# Patient Record
Sex: MALE | Race: WHITE | Employment: FULL TIME | URBAN - METROPOLITAN AREA
[De-identification: names, ages, dates, MRNs, and addresses within clinical notes are randomized per-mention and may not be internally consistent; named-entity substitution may affect disease eponyms.]

---

## 2021-06-08 ENCOUNTER — APPOINTMENT (OUTPATIENT)
Dept: GENERAL RADIOLOGY | Age: 26
End: 2021-06-08
Attending: EMERGENCY MEDICINE
Payer: COMMERCIAL

## 2021-06-08 ENCOUNTER — HOSPITAL ENCOUNTER (EMERGENCY)
Age: 26
Discharge: HOME | End: 2021-06-08
Attending: EMERGENCY MEDICINE
Payer: COMMERCIAL

## 2021-06-08 VITALS
BODY MASS INDEX: 24.39 KG/M2 | WEIGHT: 184 LBS | HEART RATE: 66 BPM | HEIGHT: 72.84 IN | SYSTOLIC BLOOD PRESSURE: 119 MMHG | TEMPERATURE: 97 F | OXYGEN SATURATION: 100 % | RESPIRATION RATE: 16 BRPM | DIASTOLIC BLOOD PRESSURE: 88 MMHG

## 2021-06-08 DIAGNOSIS — R07.9 CHEST PAIN OF UNCERTAIN ETIOLOGY: Primary | ICD-10-CM

## 2021-06-08 PROCEDURE — 80053 COMPREHEN METABOLIC PANEL: CPT | Performed by: EMERGENCY MEDICINE

## 2021-06-08 PROCEDURE — 36415 COLL VENOUS BLD VENIPUNCTURE: CPT

## 2021-06-08 PROCEDURE — 71045 X-RAY EXAM CHEST 1 VIEW: CPT | Performed by: EMERGENCY MEDICINE

## 2021-06-08 PROCEDURE — 99284 EMERGENCY DEPT VISIT MOD MDM: CPT

## 2021-06-08 PROCEDURE — 84484 ASSAY OF TROPONIN QUANT: CPT | Performed by: EMERGENCY MEDICINE

## 2021-06-08 PROCEDURE — 93010 ELECTROCARDIOGRAM REPORT: CPT

## 2021-06-08 PROCEDURE — 85025 COMPLETE CBC W/AUTO DIFF WBC: CPT | Performed by: EMERGENCY MEDICINE

## 2021-06-08 PROCEDURE — 93005 ELECTROCARDIOGRAM TRACING: CPT

## 2021-06-08 NOTE — ED INITIAL ASSESSMENT (HPI)
Pt presented to ER c/o left sided chest pain that started at 2200. Denies any SOB.  Denies nausea and vomiting

## 2021-06-08 NOTE — ED PROVIDER NOTES
Patient Seen in: THE [facility] In [place]      History   Patient presents with:  Chest Pain Angina    Stated Complaint: chest pain    HPI/Subjective:   HPI    24-year-old male presents for evaluation of chest pain.   Patient describes an other components within normal limits   CBC W/ DIFFERENTIAL - Abnormal; Notable for the following components:    WBC 12.4 (*)     All other components within normal limits   TROPONIN I - Normal   CBC WITH DIFFERENTIAL WITH PLATELET    Narrative:      The fo